# Patient Record
Sex: FEMALE | Race: WHITE | URBAN - METROPOLITAN AREA
[De-identification: names, ages, dates, MRNs, and addresses within clinical notes are randomized per-mention and may not be internally consistent; named-entity substitution may affect disease eponyms.]

---

## 2024-01-26 ENCOUNTER — APPOINTMENT (RX ONLY)
Dept: URBAN - METROPOLITAN AREA CLINIC 120 | Facility: CLINIC | Age: 54
Setting detail: DERMATOLOGY
End: 2024-01-26

## 2024-01-26 DIAGNOSIS — I83.81 VARICOSE VEINS OF LOWER EXTREMITIES WITH PAIN: ICD-10-CM | Status: WORSENING

## 2024-01-26 PROBLEM — I83.813 VARICOSE VEINS OF BILATERAL LOWER EXTREMITIES WITH PAIN: Status: ACTIVE | Noted: 2024-01-26

## 2024-01-26 PROCEDURE — ? PHOTO-DOCUMENTATION

## 2024-01-26 PROCEDURE — ? VENOUS CLINICAL SEVERITY SCORE

## 2024-01-26 PROCEDURE — ? RECOMMENDATIONS

## 2024-01-26 PROCEDURE — 99214 OFFICE O/P EST MOD 30 MIN: CPT

## 2024-01-26 PROCEDURE — ? CEAP-C CLASSIFICATION

## 2024-01-26 PROCEDURE — ? MEDICAL CONSULTATION: VENOUS DISEASE

## 2024-01-26 ASSESSMENT — LOCATION SIMPLE DESCRIPTION DERM
LOCATION SIMPLE: RIGHT PRETIBIAL REGION
LOCATION SIMPLE: LEFT PRETIBIAL REGION

## 2024-01-26 ASSESSMENT — LOCATION DETAILED DESCRIPTION DERM
LOCATION DETAILED: LEFT DISTAL PRETIBIAL REGION
LOCATION DETAILED: LEFT PROXIMAL PRETIBIAL REGION
LOCATION DETAILED: RIGHT DISTAL PRETIBIAL REGION

## 2024-01-26 ASSESSMENT — LOCATION ZONE DERM: LOCATION ZONE: LEG

## 2024-01-26 NOTE — PROCEDURE: CEAP-C CLASSIFICATION
Left Leg: Peripheral Vascular Disease?: No
Detail Level: Simple
Left Dorsalis Pedis Pulse: 2 (Easily palpable)
Show Diagnoses Variable: Yes
Left Leg Circumference: medium

## 2024-01-26 NOTE — HPI: VEIN EVALUATION
Do You Have A Family History Of Vein Disease?: no
Which Leg Is Worse?: Equally Affected
How Severe Is/Are Your Symptoms?: mild
Is This A New Presentation, Or A Follow-Up?: Vein Evaluation
Additional History: After a bout of Covid 2 years ago, she had developed arm and leg pain

## 2024-01-26 NOTE — PROCEDURE: VENOUS CLINICAL SEVERITY SCORE
Left Leg Active Ulcers: 0- None
Detail Level: Simple
Include Right Vcss In Note: Yes
Left Leg Venous Edema: 2- Afternoon edema above ankle
Right Leg Pigmentation: 0- None or focal low intensity (tan)
Left Leg Compression Therapy: 0- None or noncompliant
Right Leg Pain: 2- Daily, moderate activity limitation, occasional analgesics

## 2024-01-26 NOTE — PROCEDURE: MEDICAL CONSULTATION: VENOUS DISEASE
Left Leg Ulcer Duration: 0- None
Detail Level: Zone
Left Leg Venous Edema: 2- Afternoon edema above ankle
Right Leg: Peripheral Vascular Disease?: No
Left Dorsalis Pedis Pulse: 2 (Easily palpable)
Right Leg Circumference: medium
Include Left Vcss In Note: Yes
Left Leg Compression Therapy: 0- None or noncompliant
Left Leg Venous Hyperpigmentation: 0- None or focal low intensity (tan)